# Patient Record
(demographics unavailable — no encounter records)

---

## 2024-10-28 NOTE — PHYSICAL EXAM
[Chaperone Present] : A chaperone was present in the examining room during all aspects of the physical examination [52175] : A chaperone was present during the pelvic exam. [Appropriately responsive] : appropriately responsive [Alert] : alert [No Acute Distress] : no acute distress [No Lymphadenopathy] : no lymphadenopathy [Regular Rate Rhythm] : regular rate rhythm [No Murmurs] : no murmurs [Clear to Auscultation B/L] : clear to auscultation bilaterally [Soft] : soft [Non-tender] : non-tender [Non-distended] : non-distended [No HSM] : No HSM [No Lesions] : no lesions [No Mass] : no mass [Oriented x3] : oriented x3 [Examination Of The Breasts] : a normal appearance [No Masses] : no breast masses were palpable [Vulvar Atrophy] : vulvar atrophy [Labia Majora] : normal [Labia Minora] : normal [Atrophy] : atrophy [Normal] : normal [Uterine Adnexae] : normal [FreeTextEntry2] : Roland Fregoso [FreeTextEntry1] : small freckle between left-sided labia majora and left thigh- benign appearing [FreeTextEntry9] : Guaiac negative. No masses noted.

## 2024-10-28 NOTE — HISTORY OF PRESENT ILLNESS
[Patient reported mammogram was abnormal] : Patient reported mammogram was abnormal [FreeTextEntry1] : 10/28/2024. MOHSEN DANIEL 55 year old female  LMP  presents for annual visit. PMH C/S x2, breast bx, umbilical hernia sx  She feels well and offers no complaints. She denies VB, abn discharge or vaginitis sxs. No urinary complaints. She has normal BM, no bloody stool. She denies abdominal or pelvic pain.  She follows with breast surgeon routinely.   OBHx:   PMHx: denies SHx: C/S x2, benign breast bx , umbilical hernia surgery Meds: denies All: NKDA FHx: Denies FHx of breast, uterine, ovarian, or colon cancer. PHQ9 = 0 [TextBox_4] : summer 2024 roldan - neg [Mammogramdate] : 6/2024 [TextBox_19] : Pt reports possible hematoma finding w/ repeat 6 month f/u imaging due 12/18/2024 [BreastSonogramDate] : 6/2024 [TextBox_25] : Pt reports possible hematoma finding w/ repeat 6 month f/u imaging due 12/18/2024 [PapSmeardate] : 8/2023 [TextBox_31] : NILM, HRHPV neg.